# Patient Record
Sex: FEMALE
[De-identification: names, ages, dates, MRNs, and addresses within clinical notes are randomized per-mention and may not be internally consistent; named-entity substitution may affect disease eponyms.]

---

## 2024-05-03 ENCOUNTER — RX ONLY (OUTPATIENT)
Age: 19
Setting detail: RX ONLY
End: 2024-05-03

## 2024-05-03 ENCOUNTER — APPOINTMENT (RX ONLY)
Dept: URBAN - METROPOLITAN AREA CLINIC 86 | Facility: CLINIC | Age: 19
Setting detail: DERMATOLOGY
End: 2024-05-03

## 2024-05-03 DIAGNOSIS — L71.0 PERIORAL DERMATITIS: ICD-10-CM | Status: INADEQUATELY CONTROLLED

## 2024-05-03 PROCEDURE — 99204 OFFICE O/P NEW MOD 45 MIN: CPT

## 2024-05-03 PROCEDURE — ? ADDITIONAL NOTES

## 2024-05-03 PROCEDURE — ? TREATMENT REGIMEN

## 2024-05-03 PROCEDURE — ? COUNSELING

## 2024-05-03 PROCEDURE — ? PRESCRIPTION

## 2024-05-03 RX ORDER — TACROLIMUS 1 MG/G
OINTMENT TOPICAL
Qty: 60 | Refills: 4 | Status: ERX | COMMUNITY
Start: 2024-05-03

## 2024-05-03 RX ORDER — DOXYCYCLINE HYCLATE 50 MG/1
TABLET, FILM COATED ORAL
Qty: 120 | Refills: 0 | Status: ERX | COMMUNITY
Start: 2024-05-03

## 2024-05-03 RX ORDER — PIMECROLIMUS 10 MG/G
CREAM TOPICAL
Qty: 30 | Refills: 4 | Status: ERX | COMMUNITY
Start: 2024-05-03

## 2024-05-03 RX ORDER — DOXYCYCLINE HYCLATE 50 MG/1
TABLET, FILM COATED ORAL
Qty: 30 | Refills: 2 | Status: CANCELLED | COMMUNITY
Start: 2024-05-03

## 2024-05-03 RX ADMIN — DOXYCYCLINE HYCLATE: 50 TABLET, FILM COATED ORAL at 00:00

## 2024-05-03 RX ADMIN — TACROLIMUS: 1 OINTMENT TOPICAL at 00:00

## 2024-05-03 RX ADMIN — PIMECROLIMUS: 10 CREAM TOPICAL at 00:00

## 2024-05-03 ASSESSMENT — INVESTIGATOR STATIC GLOBAL ASSESSMENT
IN YOUR EXPERIENCE, AMONG ALL PATIENTS YOU HAVE SEEN WITH THIS CONDITION, HOW SEVERE IS THIS PATIENT'S CONDITION?: MINIMAL

## 2024-05-03 ASSESSMENT — LOCATION DETAILED DESCRIPTION DERM: LOCATION DETAILED: RIGHT LOWER CUTANEOUS LIP

## 2024-05-03 ASSESSMENT — LOCATION SIMPLE DESCRIPTION DERM: LOCATION SIMPLE: RIGHT LIP

## 2024-05-03 ASSESSMENT — LOCATION ZONE DERM: LOCATION ZONE: LIP

## 2024-05-03 NOTE — PROCEDURE: TREATMENT REGIMEN
Otc Regimen: Vanicream, Aveeno, or any other brands for sensitive skin
Detail Level: Simple
Initiate Treatment: doxycycline hyclate 50 mg tablet x 1 month\\ntacrolimus 0.1 % topical ointment or Elidel 1 % topical cream

## 2024-05-03 NOTE — PROCEDURE: ADDITIONAL NOTES
Render Risk Assessment In Note?: no
Additional Notes: Provider recommended patient switch to a plain toothpaste, hypoallergenic facial creams and lip balms, and stay away from harsh chemicals and possible allergens.\\n\\nPt reports that she will be leaving for [facility] until Dec 2024 in 2 weeks. Pt was informed that if she flares before leaving, she can return for further evaluation and treatment. If she flares while at [facility], she can try scheduling a telemedicine or see a local doctor there.
Detail Level: Simple